# Patient Record
(demographics unavailable — no encounter records)

---

## 2024-12-19 NOTE — RETURN TO WORK/SCHOOL
[FreeTextEntry1] : Patient was seen and examined for his left hand and wrist today. He may participate in track and run with the limitation of wearing his brace until January 1, 2025. If you have any questions, please contact my office at [phone] [FreeTextEntry2] : Dr. Raul Bauer

## 2024-12-19 NOTE — END OF VISIT
[FreeTextEntry3] : This note was written by Jorge Chun on 12/19/2024 acting solely as a scribe for Dr. Raul Bauer.   All medical record entries made by the Scribe were at my, Dr. Raul Bauer, direction and personally dictated by me on 12/19/2024. I have personally reviewed the chart and agree that the record accurately reflects my personal performance of the history, physical exam, assessment and plan.

## 2024-12-19 NOTE — DISCUSSION/SUMMARY
[FreeTextEntry1] :  He has findings consistent with either a left wrist sprain or an occult physeal injury after a fall 2 days ago.  There is no localized swelling or tenderness along the distal radius dorsally although he does have some swelling and tenderness along the CMC joints.  I had a discussion with the patient and their mother regarding today's visit, the prognosis of this diagnosis, and treatment recommendations and options. At this time, I recommended bracing and activity modification for the next 2 weeks. A left carpal tunnel splint was provided today.  He does run track and I told him and his mother that as long as he wears a splint, he can participate in track.  He will follow-up in 2-3 weeks if needed.  They have agreed to the above plan of management and has expressed full understanding.  All questions were fully answered to their satisfaction.   My cumulative time spent on this visit included: Preparation for the visit, review of the medical records, review of pertinent diagnostic studies, examination and counseling of the patient on the above diagnosis, treatment plan and prognosis, orders of diagnostic tests, medication and/or appropriate procedures and documentation in the medical records of today's visit.

## 2024-12-19 NOTE — HISTORY OF PRESENT ILLNESS
[Right] : right hand dominant [FreeTextEntry1] : He comes in today for evaluation of a left wrist injury after a fall 9 days ago. He was seen at an urgent care facility, where he was imaged and splinted. He denies pain or any symptoms.   He runs track.  He is accompanied by his mother today.

## 2024-12-19 NOTE — PHYSICAL EXAM
[de-identified] : - Constitutional: This is a healthy appearing young male. He is accompanied by his mother today. - Psych: Patient is alert and oriented to person, place and time.  Patient has a normal mood and affect. - Cardiovascular: Normal pulses throughout the upper extremities.   - Musculoskeletal: Gait is normal.  - Neuro: Strength and sensation are intact throughout the upper extremities.  Patient has normal coordination.  Examination of his left wrist and hand after the splint was removed demonstrates mild swelling dorsally overlying the carpal bones and CMC joints.  There is no swelling or tenderness along distal radius dorsally.  There is no snuffbox tenderness.  He has full flexion and extension of the digits.  He is neurovascularly intact distally.  [de-identified] : I reviewed x-rays of his left wrist dated 12/01/2024 which demonstrated no obvious fractures or dislocations, his physis are open.

## 2024-12-19 NOTE — ADDENDUM
[FreeTextEntry1] :  I, Jorge Chun, acted solely as a scribe for Dr. Bauer on this date on 12/19/2024.